# Patient Record
Sex: MALE | Race: BLACK OR AFRICAN AMERICAN | NOT HISPANIC OR LATINO | URBAN - NONMETROPOLITAN AREA
[De-identification: names, ages, dates, MRNs, and addresses within clinical notes are randomized per-mention and may not be internally consistent; named-entity substitution may affect disease eponyms.]

---

## 2024-02-12 ENCOUNTER — OFFICE (OUTPATIENT)
Dept: URBAN - NONMETROPOLITAN AREA CLINIC 1 | Facility: CLINIC | Age: 69
End: 2024-02-12
Payer: MEDICARE

## 2024-02-12 VITALS
HEIGHT: 69 IN | SYSTOLIC BLOOD PRESSURE: 180 MMHG | DIASTOLIC BLOOD PRESSURE: 96 MMHG | SYSTOLIC BLOOD PRESSURE: 170 MMHG | HEART RATE: 92 BPM | DIASTOLIC BLOOD PRESSURE: 91 MMHG | WEIGHT: 181 LBS

## 2024-02-12 DIAGNOSIS — R19.4 CHANGE IN BOWEL HABIT: ICD-10-CM

## 2024-02-12 DIAGNOSIS — Z80.0 FAMILY HISTORY OF MALIGNANT NEOPLASM OF DIGESTIVE ORGANS: ICD-10-CM

## 2024-02-12 PROCEDURE — 99203 OFFICE O/P NEW LOW 30 MIN: CPT | Performed by: NURSE PRACTITIONER

## 2024-02-12 NOTE — SERVICEHPINOTES
Patient with a history of colon polyps, HTN, HLD presents to the clinic today for change in stools  He reports noticing a change in BM over the last 3 weeks and has been straining with defecation.  Describes BM as very skinny, small thin stools.  He has tried Metamucil otc with suboptimal relief.  Denies melena, hematochezia, n/v/d, abdominal pain, unintentional weight loss, or fever.  His last colonoscopy was in 2021 by Dr. Gutierrez that was normal other than diverticulosis with recommendations to repeat scope in 5 years due to a family history in father age 63 dx'd with crc.  He has a good appetite and has 1 hard BM daily or every other day.  He denies upper gi complaints to speak of. He denies cardiac stents, anticoagulation therapy, or supplemental oxygen use. br 
br
Colonoscopy by Dr. Gutierrez 2021brProcedure: The colonoscope was passed through the anus under direct visualization and was advanced with ease to the cecum, confirmed by landmarks. The scope was withdrawn and the mucosa was carefully examined. The quality of the preparation was good. The views were good. The patient was taken to the recovery area in good condition.brFindings: There is sigmoid diverticulosis. The cecum and ileocecal valve appeared normal. Otherwise there was no evidence of masses, lesions, polyps, cancers, strictures, inflammatory or vascular changes. Retroflexion showed internal hemorrhoids. 
br   Recommendations to repeat in 5 years family hx of crc. 
br
br
Colonoscopy by in 2016
br The patient was taken to the recovery area in good condition. Diverticulosis found in the transverse colon, splenic flexure, and descending colon. The paient made a full recovery and was discharged home. The patient was given patient discharge instruction.

## 2024-02-12 NOTE — SERVICENOTES
Risks of procedure explained to patient and he wishes to proceed
Bowel prep prescribed and instructions given to patient
Will proceed with colonoscopy for change in BM,constipation not relieved by otc aids, and family history of crc (last one was 3 years ago)
Advised pt to trial Miralax and increase fiber intake to help with defecation. 
Pt is very concerned about cancer, and wants to "catch it early if he has it."

## 2024-04-16 ENCOUNTER — ON CAMPUS - OUTPATIENT (OUTPATIENT)
Dept: URBAN - NONMETROPOLITAN AREA HOSPITAL 34 | Facility: HOSPITAL | Age: 69
End: 2024-04-16
Payer: MEDICARE

## 2024-04-16 DIAGNOSIS — R19.4 CHANGE IN BOWEL HABIT: ICD-10-CM

## 2024-04-16 DIAGNOSIS — K57.30 DIVERTICULOSIS OF LARGE INTESTINE WITHOUT PERFORATION OR ABS: ICD-10-CM

## 2024-04-16 DIAGNOSIS — Z80.0 FAMILY HISTORY OF MALIGNANT NEOPLASM OF DIGESTIVE ORGANS: ICD-10-CM

## 2024-04-16 PROCEDURE — 45378 DIAGNOSTIC COLONOSCOPY: CPT | Performed by: INTERNAL MEDICINE
